# Patient Record
Sex: MALE | Race: WHITE | NOT HISPANIC OR LATINO | Employment: STUDENT | URBAN - METROPOLITAN AREA
[De-identification: names, ages, dates, MRNs, and addresses within clinical notes are randomized per-mention and may not be internally consistent; named-entity substitution may affect disease eponyms.]

---

## 2018-01-11 NOTE — RESULT NOTES
Verified Results  * [facility] NASAL BONES [id number] 06:19PM Ijeoma Sellers Order Number: [id number]     Test Name Result Flag Reference   XR NASAL BONES (Report)     NASAL BONES     INDICATION: Patient elbowed in the nose today and has pain  COMPARISON: None     VIEWS: 3; 3 images     FINDINGS:     There is a small fracture at the tip of the nasal bone  Paranasal sinuses appear grossly clear  IMPRESSION:     There is a small fracture at the tip of the nasal bone         Workstation performed: [id number]     Signed by:   Yong Medley MD   12/2/16

## 2022-05-17 ENCOUNTER — APPOINTMENT (OUTPATIENT)
Dept: LAB | Age: 22
End: 2022-05-17
Payer: COMMERCIAL

## 2022-05-17 DIAGNOSIS — Z02.1 PRE-EMPLOYMENT HEALTH SCREENING EXAMINATION: ICD-10-CM

## 2022-05-17 PROCEDURE — 86765 RUBEOLA ANTIBODY: CPT

## 2022-05-17 PROCEDURE — 86735 MUMPS ANTIBODY: CPT

## 2022-05-17 PROCEDURE — 86787 VARICELLA-ZOSTER ANTIBODY: CPT

## 2022-05-17 PROCEDURE — 86762 RUBELLA ANTIBODY: CPT

## 2022-05-17 PROCEDURE — 36415 COLL VENOUS BLD VENIPUNCTURE: CPT

## 2022-05-18 LAB — RUBV IGG SERPL IA-ACNC: 35.7 IU/ML

## 2022-05-19 LAB
MEV IGG SER QL: NORMAL
MEV IGM SER-ACNC: <0.91 ISR (ref 0–0.9)
MUV IGG SER QL: NORMAL
MUV IGM SER QL: 0.83 AU (ref 0–0.79)
VZV IGG SER IA-ACNC: ABNORMAL
VZV IGM SER IA-ACNC: <0.91 INDEX (ref 0–0.9)

## 2022-05-23 ENCOUNTER — APPOINTMENT (OUTPATIENT)
Dept: LAB | Age: 22
End: 2022-05-23
Payer: COMMERCIAL

## 2022-05-23 DIAGNOSIS — Z02.1 PRE-EMPLOYMENT HEALTH SCREENING EXAMINATION: ICD-10-CM

## 2022-05-23 PROCEDURE — 86787 VARICELLA-ZOSTER ANTIBODY: CPT

## 2022-05-23 PROCEDURE — 36415 COLL VENOUS BLD VENIPUNCTURE: CPT

## 2022-05-24 LAB — VZV IGG SER IA-ACNC: NORMAL

## 2022-05-25 LAB — VZV IGM SER IA-ACNC: <0.91 INDEX (ref 0–0.9)
